# Patient Record
Sex: MALE | Race: OTHER | NOT HISPANIC OR LATINO | URBAN - METROPOLITAN AREA
[De-identification: names, ages, dates, MRNs, and addresses within clinical notes are randomized per-mention and may not be internally consistent; named-entity substitution may affect disease eponyms.]

---

## 2018-01-29 ENCOUNTER — APPOINTMENT (OUTPATIENT)
Age: 38
Setting detail: DERMATOLOGY
End: 2018-01-30

## 2018-01-29 VITALS
DIASTOLIC BLOOD PRESSURE: 72 MMHG | WEIGHT: 315 LBS | SYSTOLIC BLOOD PRESSURE: 119 MMHG | HEART RATE: 77 BPM | HEIGHT: 69 IN

## 2018-01-29 DIAGNOSIS — L28.0 LICHEN SIMPLEX CHRONICUS: ICD-10-CM

## 2018-01-29 DIAGNOSIS — F17.200 NICOTINE DEPENDENCE, UNSPECIFIED, UNCOMPLICATED: ICD-10-CM

## 2018-01-29 PROCEDURE — OTHER PATIENT SPECIFIC COUNSELING: OTHER

## 2018-01-29 PROCEDURE — OTHER PRESCRIPTION: OTHER

## 2018-01-29 PROCEDURE — OTHER MIPS QUALITY: OTHER

## 2018-01-29 PROCEDURE — OTHER COUNSELING: OTHER

## 2018-01-29 PROCEDURE — 99202 OFFICE O/P NEW SF 15 MIN: CPT

## 2018-01-29 RX ORDER — TRIAMCINOLONE ACETONIDE 1 MG/G
CREAM TOPICAL
Qty: 1 | Refills: 2 | Status: ERX | COMMUNITY
Start: 2018-01-29

## 2018-01-29 ASSESSMENT — BSA RASH: BSA RASH: 3

## 2018-01-29 ASSESSMENT — LOCATION ZONE DERM: LOCATION ZONE: LEG

## 2018-01-29 ASSESSMENT — SEVERITY ASSESSMENT: SEVERITY: MILD TO MODERATE

## 2018-01-29 ASSESSMENT — LOCATION SIMPLE DESCRIPTION DERM: LOCATION SIMPLE: LEFT PRETIBIAL REGION

## 2018-01-29 ASSESSMENT — LOCATION DETAILED DESCRIPTION DERM: LOCATION DETAILED: LEFT PROXIMAL PRETIBIAL REGION

## 2018-01-29 NOTE — PROCEDURE: MIPS QUALITY
Detail Level: Zone
Quality 226: Preventive Care And Screening: Tobacco Use: Screening And Cessation Intervention: Patient screened for tobacco and is a smoker AND received Cessation Counseling
Quality 131: Pain Assessment And Follow-Up: Pain assessment documented as positive using a standardized tool AND a follow-up plan is documented

## 2018-01-29 NOTE — PROCEDURE: PATIENT SPECIFIC COUNSELING
Detail Level: Detailed
To aid with pruritus, I will prescribe Triamcinolone 0.1% topical cream to apply twice daily to affected area on the left pretibial region. If this condition remains resistant to topical treatment, biopsy is recommended at the next office visit. Follow up in three weeks for further evaluation.

## 2018-01-29 NOTE — HPI: RASH
How Severe Is Your Rash?: mild
Is This A New Presentation, Or A Follow-Up?: Rash
Additional History: Pain 1/10